# Patient Record
Sex: FEMALE | Race: WHITE | NOT HISPANIC OR LATINO | Employment: FULL TIME | RURAL
[De-identification: names, ages, dates, MRNs, and addresses within clinical notes are randomized per-mention and may not be internally consistent; named-entity substitution may affect disease eponyms.]

---

## 2020-11-17 ENCOUNTER — HISTORICAL (OUTPATIENT)
Dept: ADMINISTRATIVE | Facility: HOSPITAL | Age: 35
End: 2020-11-17

## 2020-11-18 LAB — SARS-COV+SARS-COV-2 AG RESP QL IA.RAPID: NEGATIVE

## 2022-01-11 ENCOUNTER — OFFICE VISIT (OUTPATIENT)
Dept: FAMILY MEDICINE | Facility: CLINIC | Age: 37
End: 2022-01-11
Payer: COMMERCIAL

## 2022-01-11 DIAGNOSIS — Z20.822 COUGH WITH EXPOSURE TO COVID-19 VIRUS: Primary | ICD-10-CM

## 2022-01-11 DIAGNOSIS — R05.8 COUGH WITH EXPOSURE TO COVID-19 VIRUS: Primary | ICD-10-CM

## 2022-01-11 LAB — SARS-COV-2 RNA RESP QL NAA+PROBE: POSITIVE

## 2022-01-11 PROCEDURE — 87635 SARS-COV-2 (COVID-19) QUALITATIVE PCR: ICD-10-PCS | Mod: ,,, | Performed by: CLINICAL MEDICAL LABORATORY

## 2022-01-11 PROCEDURE — 99213 PR OFFICE/OUTPT VISIT, EST, LEVL III, 20-29 MIN: ICD-10-PCS | Mod: GC,,, | Performed by: FAMILY MEDICINE

## 2022-01-11 PROCEDURE — 99213 OFFICE O/P EST LOW 20 MIN: CPT | Mod: GC,,, | Performed by: FAMILY MEDICINE

## 2022-01-11 PROCEDURE — 87635 SARS-COV-2 COVID-19 AMP PRB: CPT | Mod: ,,, | Performed by: CLINICAL MEDICAL LABORATORY

## 2022-01-12 VITALS
HEIGHT: 63 IN | WEIGHT: 150 LBS | HEART RATE: 73 BPM | BODY MASS INDEX: 26.58 KG/M2 | OXYGEN SATURATION: 99 % | TEMPERATURE: 97 F

## 2022-01-12 PROBLEM — R05.8 COUGH WITH EXPOSURE TO COVID-19 VIRUS: Status: ACTIVE | Noted: 2022-01-12

## 2022-01-12 PROBLEM — Z20.822 COUGH WITH EXPOSURE TO COVID-19 VIRUS: Status: ACTIVE | Noted: 2022-01-12

## 2022-01-12 NOTE — PROGRESS NOTES
Subjective:       Patient ID: Isabel Dixon is a 36 y.o. female.    Chief Complaint: No chief complaint on file.      HPI     36 y.o. patient who presents to [facility] for COVID-19 testing.     Symptoms: see ROS  Onset: 1/7  Known COVID-19 exposure: yes  Employment/School: teacher   Last shift: 1/6  Tobacco use: No  PMH: HTN  Vaccinated: yes    Review of Systems   Constitutional: Positive for fatigue. Negative for fever.   HENT: Positive for nasal congestion and sore throat.    Respiratory: Positive for cough. Negative for shortness of breath.    Cardiovascular: Negative for chest pain.   Gastrointestinal: Positive for abdominal pain and diarrhea. Negative for nausea and vomiting.   Genitourinary: Negative for dysuria.   Musculoskeletal: Positive for myalgias.   Integumentary:  Negative for rash.   Neurological: Positive for weakness and headaches. Negative for dizziness.           Objective:      Physical Exam  Constitutional:       General: She is not in acute distress.     Appearance: Normal appearance. She is not toxic-appearing.   HENT:      Head: Normocephalic and atraumatic.      Right Ear: External ear normal.      Left Ear: External ear normal.      Nose: Nose normal.   Eyes:      Extraocular Movements: Extraocular movements intact.   Cardiovascular:      Rate and Rhythm: Normal rate.      Pulses: Normal pulses.   Pulmonary:      Effort: Pulmonary effort is normal. No respiratory distress.   Musculoskeletal:      Cervical back: Normal range of motion.   Skin:     General: Skin is warm and dry.   Neurological:      Mental Status: She is alert and oriented to person, place, and time.           Assessment:       1. Cough with exposure to COVID-19 virus        Plan:       COVID-19 TESTING  -- patient with symptoms  -- patient with known exposure to COVID-19  -- influenza A/B: not done  -- COVID-19 PCR: positive  -- patient provided with educational material   -- patient given instructions for proper home  monitoring and isolation procedures  -- patient instructed to notify clinic of any changes, report to ED for evaluation of any new or worsening chest pain, shortness of breath, or other severe symptoms    Recommended quarantine/isolation: 10 days

## 2022-09-21 ENCOUNTER — OFFICE VISIT (OUTPATIENT)
Dept: OBSTETRICS AND GYNECOLOGY | Facility: CLINIC | Age: 37
End: 2022-09-21
Payer: COMMERCIAL

## 2022-09-21 VITALS
HEIGHT: 63 IN | HEART RATE: 69 BPM | BODY MASS INDEX: 27.07 KG/M2 | WEIGHT: 152.81 LBS | DIASTOLIC BLOOD PRESSURE: 81 MMHG | RESPIRATION RATE: 16 BRPM | SYSTOLIC BLOOD PRESSURE: 122 MMHG | TEMPERATURE: 98 F

## 2022-09-21 DIAGNOSIS — E55.9 VITAMIN D DEFICIENCY: ICD-10-CM

## 2022-09-21 DIAGNOSIS — I10 HYPERTENSION, UNSPECIFIED TYPE: ICD-10-CM

## 2022-09-21 DIAGNOSIS — G43.839 INTRACTABLE MENSTRUAL MIGRAINE WITHOUT STATUS MIGRAINOSUS: ICD-10-CM

## 2022-09-21 DIAGNOSIS — Z20.822 COUGH WITH EXPOSURE TO COVID-19 VIRUS: ICD-10-CM

## 2022-09-21 DIAGNOSIS — R05.8 COUGH WITH EXPOSURE TO COVID-19 VIRUS: ICD-10-CM

## 2022-09-21 DIAGNOSIS — N85.2 BULKY OR ENLARGED UTERUS: Primary | ICD-10-CM

## 2022-09-21 LAB
25(OH)D3 SERPL-MCNC: 19.7 NG/ML
ALBUMIN SERPL BCP-MCNC: 4.1 G/DL (ref 3.5–5)
ALBUMIN/GLOB SERPL: 1.3 {RATIO}
ALP SERPL-CCNC: 51 U/L (ref 37–98)
ALT SERPL W P-5'-P-CCNC: 23 U/L (ref 13–56)
ANION GAP SERPL CALCULATED.3IONS-SCNC: 12 MMOL/L (ref 7–16)
AST SERPL W P-5'-P-CCNC: 10 U/L (ref 15–37)
BASOPHILS # BLD AUTO: 0.05 K/UL (ref 0–0.2)
BASOPHILS NFR BLD AUTO: 0.6 % (ref 0–1)
BILIRUB SERPL-MCNC: 0.5 MG/DL (ref ?–1.2)
BILIRUB UR QL STRIP: NEGATIVE
BUN SERPL-MCNC: 12 MG/DL (ref 7–18)
BUN/CREAT SERPL: 17 (ref 6–20)
CALCIUM SERPL-MCNC: 8.6 MG/DL (ref 8.5–10.1)
CHLORIDE SERPL-SCNC: 109 MMOL/L (ref 98–107)
CLARITY UR: CLEAR
CO2 SERPL-SCNC: 23 MMOL/L (ref 21–32)
COLOR UR: COLORLESS
CREAT SERPL-MCNC: 0.69 MG/DL (ref 0.55–1.02)
DIFFERENTIAL METHOD BLD: ABNORMAL
EGFR (NO RACE VARIABLE) (RUSH/TITUS): 114 ML/MIN/1.73M²
EOSINOPHIL # BLD AUTO: 0.02 K/UL (ref 0–0.5)
EOSINOPHIL NFR BLD AUTO: 0.3 % (ref 1–4)
ERYTHROCYTE [DISTWIDTH] IN BLOOD BY AUTOMATED COUNT: 13.2 % (ref 11.5–14.5)
GLOBULIN SER-MCNC: 3.2 G/DL (ref 2–4)
GLUCOSE SERPL-MCNC: 91 MG/DL (ref 74–106)
GLUCOSE UR STRIP-MCNC: NORMAL MG/DL
HCT VFR BLD AUTO: 36.8 % (ref 38–47)
HGB BLD-MCNC: 12.2 G/DL (ref 12–16)
IMM GRANULOCYTES # BLD AUTO: 0.02 K/UL (ref 0–0.04)
IMM GRANULOCYTES NFR BLD: 0.3 % (ref 0–0.4)
KETONES UR STRIP-SCNC: NORMAL MG/DL
LEUKOCYTE ESTERASE UR QL STRIP: NEGATIVE
LYMPHOCYTES # BLD AUTO: 1.99 K/UL (ref 1–4.8)
LYMPHOCYTES NFR BLD AUTO: 24.9 % (ref 27–41)
MCH RBC QN AUTO: 31.1 PG (ref 27–31)
MCHC RBC AUTO-ENTMCNC: 33.2 G/DL (ref 32–36)
MCV RBC AUTO: 93.9 FL (ref 80–96)
MONOCYTES # BLD AUTO: 0.59 K/UL (ref 0–0.8)
MONOCYTES NFR BLD AUTO: 7.4 % (ref 2–6)
MPC BLD CALC-MCNC: 10.5 FL (ref 9.4–12.4)
NEUTROPHILS # BLD AUTO: 5.32 K/UL (ref 1.8–7.7)
NEUTROPHILS NFR BLD AUTO: 66.5 % (ref 53–65)
NITRITE UR QL STRIP: NEGATIVE
NRBC # BLD AUTO: 0 X10E3/UL
NRBC, AUTO (.00): 0 %
PH UR STRIP: 6.5 PH UNITS
PLATELET # BLD AUTO: 267 K/UL (ref 150–400)
POTASSIUM SERPL-SCNC: 4.4 MMOL/L (ref 3.5–5.1)
PROT SERPL-MCNC: 7.3 G/DL (ref 6.4–8.2)
PROT UR QL STRIP: NEGATIVE
RBC # BLD AUTO: 3.92 M/UL (ref 4.2–5.4)
RBC # UR STRIP: NEGATIVE /UL
SODIUM SERPL-SCNC: 140 MMOL/L (ref 136–145)
SP GR UR STRIP: 1.01
UROBILINOGEN UR STRIP-ACNC: NORMAL MG/DL
WBC # BLD AUTO: 7.99 K/UL (ref 4.5–11)

## 2022-09-21 PROCEDURE — 87591 N.GONORRHOEAE DNA AMP PROB: CPT | Mod: ,,, | Performed by: CLINICAL MEDICAL LABORATORY

## 2022-09-21 PROCEDURE — 88142 CYTOPATH C/V THIN LAYER: CPT | Mod: GCY | Performed by: OBSTETRICS & GYNECOLOGY

## 2022-09-21 PROCEDURE — 87591 CHLAMYDIA/GC, PCR (THINPREP): ICD-10-PCS | Mod: ,,, | Performed by: CLINICAL MEDICAL LABORATORY

## 2022-09-21 PROCEDURE — 82306 VITAMIN D: ICD-10-PCS | Mod: ,,, | Performed by: CLINICAL MEDICAL LABORATORY

## 2022-09-21 PROCEDURE — 87491 CHLMYD TRACH DNA AMP PROBE: CPT | Mod: ,,, | Performed by: CLINICAL MEDICAL LABORATORY

## 2022-09-21 PROCEDURE — 81003 URINALYSIS, REFLEX TO URINE CULTURE: ICD-10-PCS | Mod: QW,,, | Performed by: CLINICAL MEDICAL LABORATORY

## 2022-09-21 PROCEDURE — 36415 PR COLLECTION VENOUS BLOOD,VENIPUNCTURE: ICD-10-PCS | Mod: ,,, | Performed by: OBSTETRICS & GYNECOLOGY

## 2022-09-21 PROCEDURE — 87624 HUMAN PAPILLOMAVIRUS (HPV): ICD-10-PCS | Mod: ,,, | Performed by: CLINICAL MEDICAL LABORATORY

## 2022-09-21 PROCEDURE — 82306 VITAMIN D 25 HYDROXY: CPT | Mod: ,,, | Performed by: CLINICAL MEDICAL LABORATORY

## 2022-09-21 PROCEDURE — 81003 URINALYSIS AUTO W/O SCOPE: CPT | Mod: QW,,, | Performed by: CLINICAL MEDICAL LABORATORY

## 2022-09-21 PROCEDURE — 80053 COMPREHEN METABOLIC PANEL: CPT | Mod: ,,, | Performed by: CLINICAL MEDICAL LABORATORY

## 2022-09-21 PROCEDURE — 99205 PR OFFICE/OUTPT VISIT, NEW, LEVL V, 60-74 MIN: ICD-10-PCS | Mod: ,,, | Performed by: OBSTETRICS & GYNECOLOGY

## 2022-09-21 PROCEDURE — 99205 OFFICE O/P NEW HI 60 MIN: CPT | Mod: ,,, | Performed by: OBSTETRICS & GYNECOLOGY

## 2022-09-21 PROCEDURE — 85025 CBC WITH DIFFERENTIAL: ICD-10-PCS | Mod: ,,, | Performed by: CLINICAL MEDICAL LABORATORY

## 2022-09-21 PROCEDURE — 87491 CHLAMYDIA/GC, PCR (THINPREP): ICD-10-PCS | Mod: ,,, | Performed by: CLINICAL MEDICAL LABORATORY

## 2022-09-21 PROCEDURE — 36415 COLL VENOUS BLD VENIPUNCTURE: CPT | Mod: ,,, | Performed by: OBSTETRICS & GYNECOLOGY

## 2022-09-21 PROCEDURE — 80053 COMPREHENSIVE METABOLIC PANEL: ICD-10-PCS | Mod: ,,, | Performed by: CLINICAL MEDICAL LABORATORY

## 2022-09-21 PROCEDURE — 87624 HPV HI-RISK TYP POOLED RSLT: CPT | Mod: ,,, | Performed by: CLINICAL MEDICAL LABORATORY

## 2022-09-21 PROCEDURE — 85025 COMPLETE CBC W/AUTO DIFF WBC: CPT | Mod: ,,, | Performed by: CLINICAL MEDICAL LABORATORY

## 2022-09-21 RX ORDER — LISINOPRIL 5 MG/1
5 TABLET ORAL DAILY
COMMUNITY
Start: 2022-08-09

## 2022-09-21 RX ORDER — ERENUMAB-AOOE 70 MG/ML
70 INJECTION SUBCUTANEOUS
Qty: 1 ML | Refills: 11 | Status: SHIPPED | OUTPATIENT
Start: 2022-09-21 | End: 2023-08-23

## 2022-09-21 NOTE — PATIENT INSTRUCTIONS
Dr. David Iyer thanks you for this office visit at [facility].    Diagnosis for this visit:   Problem List Items Addressed This Visit          Pulmonary    RESOLVED: Cough with exposure to COVID-19 virus       Cardiac/Vascular    Hypertension (Chronic)     Other Visit Diagnoses       Intractable menstrual migraine without status migrainosus    -  Primary    Bulky or enlarged uterus                 The Plan:  Initiate Aimovig therapy for menstrual migraines; pelvic ultrasound; screening labs; thin prep Pap      Please practice best food and exercise habits for your age.    Dr. David Iyer recommends avoidance of smoking and illicit medications or habits.    Please call  or schedule for any change in your health.     Please keep the next scheduled appointment or call for any need to change the appointment.     Dr. David Iyer recommends yearly exams for good health maintenance.      Thank you    Dr. David Iyer  09/21/2022 5:17 PM

## 2022-09-21 NOTE — PROGRESS NOTES
Isabel Dixon female  for   Chief Complaint   Patient presents with    Annual Exam     Repeat new patient here for annual exam with pap. Patient states she is having bad headaches that last 3 days straight without relief.      OB History          4    Para   3    Term                AB   1    Living   3         SAB   1    IAB        Ectopic        Multiple        Live Births                      HPI:  37-year-old  4, para 3, AB1  female presents with a complaint of intractable presumed menstrual migraines.  Patient has been treated by various doctors with multiple medications without relief.  The primary problem appears to be triggering with menses the migraine headache    Last menstrual period was 2022.  Patient does give a history of a tubal sterilization.  That occurred with her last  section.  The patient has had 1 vaginal delivery and 2  sections with associated tubal sterilization at the last  section.    Patient denies dyspareunia.  Patient has no overactive bladder issues or urinary stress incontinence.  Patient does complain of voiding every 2 hours with no real nocturia and she does complain of some slight urinary stress incontinence.  The patient is not wear perineal pad.     Patient has use vitamin-D in the past currently is on no vitamin-D supplementation.    Past Medical History:   Diagnosis Date    Anemia 2009    Fibroid ?    Hypertension       Past Surgical History:   Procedure Laterality Date    ABDOMINAL SURGERY  ?    Removed fibroids and fixed fused uterus     SECTION   and     TUBAL LIGATION        Review of patient's allergies indicates:   Allergen Reactions    Iodine Rash    Shellfish containing products Anaphylaxis    Penicillins Hives        Review of Systems:Pertinent items are noted in HPI.     Physical exam:    /81 (BP Location: Left arm, Patient Position: Sitting)   Pulse 69   Temp 98.4 °F  "(36.9 °C)   Resp 16   Ht 5' 3" (1.6 m)   Wt 69.3 kg (152 lb 12.8 oz)   LMP 09/06/2022   BMI 27.07 kg/m²      General Appearance: healthy, alert, no distress, smiling, 27 BMI    HEENT: Normal exam    Lymphatic: no palpable lymphadenopathy, no hepatosplenomegaly    Chest:           Breasts:No dimpling, nipple retraction or discharge. No masses or nodes., Normal to inspection, and Normal breast tissue bilaterally           Lungs:clear to auscultation bilaterally, normal percussion bilaterally           Heart: regular rate and rhythm, S1, S2 normal, no murmur, click, rub or gallop and regular rate and rhythm    Abdomen: soft, non-tender, without masses or organomegaly, normal bowel sounds, without guarding, without rebound, and Pfannenstiel incision well-healed with no ventral hernia; no abdominal bruit and no ascites    Pelvic:                    Vulva: normal appearing vulva with no masses, tenderness or lesions                    Cervix: normal appearing cervix without discharge or lesions, multiparous os                    Uterus anteverted, uterus is slightly enlarged                     Annexa(e): normal adnexa in size, nontender and no masses                   Rectal: normal rectal, no masses, guaiac negative stool obtained.     Extremity: normal, extremities warm, no clubbing, no cyanosis, no edema, non-tender    Skin: normal exam        Assessment:   Problem List Items Addressed This Visit          Pulmonary    RESOLVED: Cough with exposure to COVID-19 virus       Cardiac/Vascular    Hypertension (Chronic)    Relevant Orders    CBC Auto Differential (Completed)    Comprehensive Metabolic Panel (Completed)    Vitamin D (Completed)    Urinalysis, Reflex to Urine Culture (Completed)    ThinPrep Pap Test (Completed)    HPV DNA probe, amplified (Completed)    CT/GC, PCR (THINPREP) (Completed)     Other Visit Diagnoses       Bulky or enlarged uterus    -  Primary    Relevant Orders    CBC Auto Differential " (Completed)    Comprehensive Metabolic Panel (Completed)    Urinalysis, Reflex to Urine Culture (Completed)    ThinPrep Pap Test (Completed)    US Pelvis Complete Non OB    HPV DNA probe, amplified (Completed)    CT/GC, PCR (THINPREP) (Completed)    Intractable menstrual migraine without status migrainosus        Relevant Orders    CBC Auto Differential (Completed)    Comprehensive Metabolic Panel (Completed)    Urinalysis, Reflex to Urine Culture (Completed)    ThinPrep Pap Test (Completed)    HPV DNA probe, amplified (Completed)    CT/GC, PCR (THINPREP) (Completed)    Vitamin D deficiency        Vitamin D 25-Hydroxy, Blood ng/mL 19.7 -low      Relevant Orders    Vitamin D (Completed)             Plan:  Screening labs; thin prep; vitamin-D check             Recommend pelvic ultrasound for size and shape the uterus             Recommend Aimovig for migraine headaches             Recommend vitamin D3 over-the-counter 4000 units daily.                           Addendum on 09/26/2022:  Patient does need extra vitamin-D.  She will be placed on vitamin D2 58865 units twice a week for indefinite therapy and recheck her total vitamin-D in 3 months.

## 2022-09-23 LAB
CHLAMYDIA BY PCR: NEGATIVE
GH SERPL-MCNC: NORMAL NG/ML
HPV 16: NEGATIVE
HPV 18: NEGATIVE
HPV OTHER: NEGATIVE
INSULIN SERPL-ACNC: NORMAL U[IU]/ML
LAB AP CLINICAL INFORMATION: NORMAL
LAB AP GYN INTERPRETATION: NEGATIVE
LAB AP PAP DISCLAIMER COMMENTS: NORMAL
N. GONORRHOEAE (GC) BY PCR: NEGATIVE
RENIN PLAS-CCNC: NORMAL NG/ML/H

## 2022-09-26 ENCOUNTER — TELEPHONE (OUTPATIENT)
Dept: OBSTETRICS AND GYNECOLOGY | Facility: CLINIC | Age: 37
End: 2022-09-26
Payer: COMMERCIAL

## 2022-09-26 RX ORDER — ERGOCALCIFEROL 1.25 MG/1
50000 CAPSULE ORAL
Qty: 24 CAPSULE | Refills: 3 | Status: SHIPPED | OUTPATIENT
Start: 2022-09-29 | End: 2023-08-23

## 2022-09-26 NOTE — TELEPHONE ENCOUNTER
Informed pt that [facility] denied her Aimovig because our office doesn't have any clinical notes on other medications that she has tried. Advised her to have the provider who was treating her for her migraines to send in clinical notes to [facility] and that they will pay for medication.    Voiced agreement and understanding

## 2022-09-27 NOTE — TELEPHONE ENCOUNTER
----- Message from David Iyer MD sent at 9/26/2022  1:44 PM CDT -----  Call patient    Advised her that Dr. David Iyer has sent to her pharmacy actual vitamin-D 2 to take twice weekly in addition to her vitamin-D 3 daily.  Her vitamin-D was very low at 19 ng/mL.  She should have her vitamin-D rechecked in 3-4 months.               Addendum on 09/26/2022:  Patient does need extra vitamin-D.  She will be placed on vitamin D2 79971 units twice a week for indefinite therapy and recheck her total vitamin-D in 3 months.      9/27/2022  Per Dr. Iyer, notified patient via phone re:  Vitamin D level results have returned very low / deficient (result:  19);  Needs treatment with over the counter Vitamin D3 supplement 4,000 units daily, as well as, prescription Vitamin D2 supplement twice a week (every  3 - 4 days).  Keep next scheduled Follow Up appointment for Pelvic Ultrasound.  Return to clinic in 3 - 4 months to recheck Vitamin D level.  Patient voiced agreement and understanding.

## 2022-09-30 ENCOUNTER — HOSPITAL ENCOUNTER (OUTPATIENT)
Dept: RADIOLOGY | Facility: HOSPITAL | Age: 37
Discharge: HOME | End: 2022-09-30
Attending: OBSTETRICS & GYNECOLOGY
Payer: COMMERCIAL

## 2022-09-30 DIAGNOSIS — N85.2 BULKY OR ENLARGED UTERUS: ICD-10-CM

## 2022-09-30 PROCEDURE — 76856 US EXAM PELVIC COMPLETE: CPT | Mod: TC

## 2022-09-30 PROCEDURE — 76856 US PELVIS COMPLETE NON OB: ICD-10-PCS | Mod: 26,,, | Performed by: RADIOLOGY

## 2022-09-30 PROCEDURE — 76856 US EXAM PELVIC COMPLETE: CPT | Mod: 26,,, | Performed by: RADIOLOGY

## 2023-08-23 ENCOUNTER — OFFICE VISIT (OUTPATIENT)
Dept: FAMILY MEDICINE | Facility: CLINIC | Age: 38
End: 2023-08-23
Payer: COMMERCIAL

## 2023-08-23 VITALS
SYSTOLIC BLOOD PRESSURE: 122 MMHG | OXYGEN SATURATION: 98 % | WEIGHT: 153 LBS | BODY MASS INDEX: 27.11 KG/M2 | RESPIRATION RATE: 18 BRPM | DIASTOLIC BLOOD PRESSURE: 74 MMHG | HEART RATE: 94 BPM | HEIGHT: 63 IN | TEMPERATURE: 98 F

## 2023-08-23 DIAGNOSIS — Z13.220 ENCOUNTER FOR SCREENING FOR LIPOID DISORDERS: ICD-10-CM

## 2023-08-23 DIAGNOSIS — I10 ESSENTIAL HYPERTENSION, BENIGN: Primary | ICD-10-CM

## 2023-08-23 DIAGNOSIS — G43.829 MENSTRUAL MIGRAINE WITHOUT STATUS MIGRAINOSUS, NOT INTRACTABLE: ICD-10-CM

## 2023-08-23 DIAGNOSIS — Z11.59 ENCOUNTER FOR HEPATITIS C SCREENING TEST FOR LOW RISK PATIENT: ICD-10-CM

## 2023-08-23 DIAGNOSIS — E55.9 VITAMIN D DEFICIENCY: ICD-10-CM

## 2023-08-23 DIAGNOSIS — Z13.1 SCREENING FOR DIABETES MELLITUS: ICD-10-CM

## 2023-08-23 DIAGNOSIS — Z11.4 ENCOUNTER FOR SCREENING FOR HIV: ICD-10-CM

## 2023-08-23 LAB
25(OH)D3 SERPL-MCNC: 43 NG/ML
ALBUMIN SERPL BCP-MCNC: 3.6 G/DL (ref 3.5–5)
ALBUMIN/GLOB SERPL: 1.1 {RATIO}
ALP SERPL-CCNC: 50 U/L (ref 37–98)
ALT SERPL W P-5'-P-CCNC: 21 U/L (ref 13–56)
ANION GAP SERPL CALCULATED.3IONS-SCNC: 11 MMOL/L (ref 7–16)
AST SERPL W P-5'-P-CCNC: 7 U/L (ref 15–37)
BASOPHILS # BLD AUTO: 0.04 K/UL (ref 0–0.2)
BASOPHILS NFR BLD AUTO: 0.6 % (ref 0–1)
BILIRUB SERPL-MCNC: 0.3 MG/DL (ref ?–1.2)
BUN SERPL-MCNC: 15 MG/DL (ref 7–18)
BUN/CREAT SERPL: 19 (ref 6–20)
CALCIUM SERPL-MCNC: 8.3 MG/DL (ref 8.5–10.1)
CHLORIDE SERPL-SCNC: 107 MMOL/L (ref 98–107)
CHOLEST SERPL-MCNC: 184 MG/DL (ref 0–200)
CHOLEST/HDLC SERPL: 3.6 {RATIO}
CO2 SERPL-SCNC: 26 MMOL/L (ref 21–32)
CREAT SERPL-MCNC: 0.8 MG/DL (ref 0.55–1.02)
DIFFERENTIAL METHOD BLD: ABNORMAL
EGFR (NO RACE VARIABLE) (RUSH/TITUS): 96 ML/MIN/1.73M2
EOSINOPHIL # BLD AUTO: 0.03 K/UL (ref 0–0.5)
EOSINOPHIL NFR BLD AUTO: 0.4 % (ref 1–4)
ERYTHROCYTE [DISTWIDTH] IN BLOOD BY AUTOMATED COUNT: 13.4 % (ref 11.5–14.5)
EST. AVERAGE GLUCOSE BLD GHB EST-MCNC: 77 MG/DL
GLOBULIN SER-MCNC: 3.3 G/DL (ref 2–4)
GLUCOSE SERPL-MCNC: 99 MG/DL (ref 74–106)
HBA1C MFR BLD HPLC: 4.9 % (ref 4.5–6.6)
HCT VFR BLD AUTO: 34.5 % (ref 38–47)
HCV AB SER QL: NORMAL
HDLC SERPL-MCNC: 51 MG/DL (ref 40–60)
HGB BLD-MCNC: 11.6 G/DL (ref 12–16)
HIV 1+O+2 AB SERPL QL: NORMAL
IMM GRANULOCYTES # BLD AUTO: 0.02 K/UL (ref 0–0.04)
IMM GRANULOCYTES NFR BLD: 0.3 % (ref 0–0.4)
LDLC SERPL CALC-MCNC: 117 MG/DL
LDLC/HDLC SERPL: 2.3 {RATIO}
LYMPHOCYTES # BLD AUTO: 1.6 K/UL (ref 1–4.8)
LYMPHOCYTES NFR BLD AUTO: 23.8 % (ref 27–41)
MCH RBC QN AUTO: 30.9 PG (ref 27–31)
MCHC RBC AUTO-ENTMCNC: 33.6 G/DL (ref 32–36)
MCV RBC AUTO: 92 FL (ref 80–96)
MONOCYTES # BLD AUTO: 0.46 K/UL (ref 0–0.8)
MONOCYTES NFR BLD AUTO: 6.9 % (ref 2–6)
MPC BLD CALC-MCNC: 10.1 FL (ref 9.4–12.4)
NEUTROPHILS # BLD AUTO: 4.56 K/UL (ref 1.8–7.7)
NEUTROPHILS NFR BLD AUTO: 68 % (ref 53–65)
NONHDLC SERPL-MCNC: 133 MG/DL
NRBC # BLD AUTO: 0 X10E3/UL
NRBC, AUTO (.00): 0 %
PLATELET # BLD AUTO: 236 K/UL (ref 150–400)
POTASSIUM SERPL-SCNC: 4 MMOL/L (ref 3.5–5.1)
PROT SERPL-MCNC: 6.9 G/DL (ref 6.4–8.2)
RBC # BLD AUTO: 3.75 M/UL (ref 4.2–5.4)
SODIUM SERPL-SCNC: 140 MMOL/L (ref 136–145)
TRIGL SERPL-MCNC: 79 MG/DL (ref 35–150)
TSH SERPL DL<=0.005 MIU/L-ACNC: 1.81 UIU/ML (ref 0.36–3.74)
VLDLC SERPL-MCNC: 16 MG/DL
WBC # BLD AUTO: 6.71 K/UL (ref 4.5–11)

## 2023-08-23 PROCEDURE — 3008F BODY MASS INDEX DOCD: CPT | Mod: ,,,

## 2023-08-23 PROCEDURE — 87389 HIV 1 / 2 ANTIBODY: ICD-10-PCS | Mod: ,,, | Performed by: CLINICAL MEDICAL LABORATORY

## 2023-08-23 PROCEDURE — 99203 PR OFFICE/OUTPT VISIT, NEW, LEVL III, 30-44 MIN: ICD-10-PCS | Mod: ,,,

## 2023-08-23 PROCEDURE — 3044F HG A1C LEVEL LT 7.0%: CPT | Mod: ,,,

## 2023-08-23 PROCEDURE — 83036 HEMOGLOBIN GLYCOSYLATED A1C: CPT | Mod: ,,, | Performed by: CLINICAL MEDICAL LABORATORY

## 2023-08-23 PROCEDURE — 1160F RVW MEDS BY RX/DR IN RCRD: CPT | Mod: ,,,

## 2023-08-23 PROCEDURE — 80061 LIPID PANEL: CPT | Mod: ,,, | Performed by: CLINICAL MEDICAL LABORATORY

## 2023-08-23 PROCEDURE — 82306 VITAMIN D 25 HYDROXY: CPT | Mod: ,,, | Performed by: CLINICAL MEDICAL LABORATORY

## 2023-08-23 PROCEDURE — 99203 OFFICE O/P NEW LOW 30 MIN: CPT | Mod: ,,,

## 2023-08-23 PROCEDURE — 86803 HEPATITIS C AB TEST: CPT | Mod: ,,, | Performed by: CLINICAL MEDICAL LABORATORY

## 2023-08-23 PROCEDURE — 87389 HIV-1 AG W/HIV-1&-2 AB AG IA: CPT | Mod: ,,, | Performed by: CLINICAL MEDICAL LABORATORY

## 2023-08-23 PROCEDURE — 3074F PR MOST RECENT SYSTOLIC BLOOD PRESSURE < 130 MM HG: ICD-10-PCS | Mod: ,,,

## 2023-08-23 PROCEDURE — 86803 HEPATITIS C ANTIBODY: ICD-10-PCS | Mod: ,,, | Performed by: CLINICAL MEDICAL LABORATORY

## 2023-08-23 PROCEDURE — 3074F SYST BP LT 130 MM HG: CPT | Mod: ,,,

## 2023-08-23 PROCEDURE — 80061 LIPID PANEL: ICD-10-PCS | Mod: ,,, | Performed by: CLINICAL MEDICAL LABORATORY

## 2023-08-23 PROCEDURE — 3008F PR BODY MASS INDEX (BMI) DOCUMENTED: ICD-10-PCS | Mod: ,,,

## 2023-08-23 PROCEDURE — 1159F PR MEDICATION LIST DOCUMENTED IN MEDICAL RECORD: ICD-10-PCS | Mod: ,,,

## 2023-08-23 PROCEDURE — 4010F ACE/ARB THERAPY RXD/TAKEN: CPT | Mod: ,,,

## 2023-08-23 PROCEDURE — 82306 VITAMIN D: ICD-10-PCS | Mod: ,,, | Performed by: CLINICAL MEDICAL LABORATORY

## 2023-08-23 PROCEDURE — 80050 GENERAL HEALTH PANEL: CPT | Mod: ,,, | Performed by: CLINICAL MEDICAL LABORATORY

## 2023-08-23 PROCEDURE — 80050 PR  GENERAL HEALTH PANEL: ICD-10-PCS | Mod: ,,, | Performed by: CLINICAL MEDICAL LABORATORY

## 2023-08-23 PROCEDURE — 3078F DIAST BP <80 MM HG: CPT | Mod: ,,,

## 2023-08-23 PROCEDURE — 36415 COLL VENOUS BLD VENIPUNCTURE: CPT | Mod: ,,,

## 2023-08-23 PROCEDURE — 4010F PR ACE/ARB THEARPY RXD/TAKEN: ICD-10-PCS | Mod: ,,,

## 2023-08-23 PROCEDURE — 1160F PR REVIEW ALL MEDS BY PRESCRIBER/CLIN PHARMACIST DOCUMENTED: ICD-10-PCS | Mod: ,,,

## 2023-08-23 PROCEDURE — 3044F PR MOST RECENT HEMOGLOBIN A1C LEVEL <7.0%: ICD-10-PCS | Mod: ,,,

## 2023-08-23 PROCEDURE — 3078F PR MOST RECENT DIASTOLIC BLOOD PRESSURE < 80 MM HG: ICD-10-PCS | Mod: ,,,

## 2023-08-23 PROCEDURE — 36415 PR COLLECTION VENOUS BLOOD,VENIPUNCTURE: ICD-10-PCS | Mod: ,,,

## 2023-08-23 PROCEDURE — 83036 HEMOGLOBIN A1C: ICD-10-PCS | Mod: ,,, | Performed by: CLINICAL MEDICAL LABORATORY

## 2023-08-23 PROCEDURE — 1159F MED LIST DOCD IN RCRD: CPT | Mod: ,,,

## 2023-08-24 NOTE — PROGRESS NOTES
Subjective     Patient ID: Isabel Dixon is a 38 y.o. female.    Chief Complaint: Follow-up (Went and seen Dr. Iyer and diagnosed her with Acute Menstrual Migraines)    Patient presents today for complaints of worsening migraine headaches. She has been diagnosed with menstrual migraines in the past. She has tried and failed topamax, beta blocker, triptans, and was denies amovig by her insurance. She has a history of a tubal ligation.    Follow-up  Associated symptoms include headaches. Pertinent negatives include no abdominal pain, change in bowel habit, chest pain, chills, coughing, fever, joint swelling, myalgias, numbness or weakness.     Review of Systems   Constitutional:  Negative for activity change, appetite change, chills and fever.   HENT:  Negative for ear pain, hearing loss, trouble swallowing and voice change.    Eyes:  Negative for visual disturbance.   Respiratory:  Negative for apnea, cough, chest tightness and shortness of breath.    Cardiovascular:  Negative for chest pain, palpitations and leg swelling.   Gastrointestinal:  Negative for abdominal pain, blood in stool, change in bowel habit, reflux and change in bowel habit.   Endocrine: Negative for cold intolerance and heat intolerance.   Genitourinary:  Negative for bladder incontinence, difficulty urinating, flank pain, menstrual irregularity and menstrual problem.   Musculoskeletal:  Negative for back pain, gait problem, joint swelling and myalgias.   Integumentary:  Negative for color change and pallor.   Neurological:  Positive for headaches. Negative for dizziness, weakness and numbness.   Psychiatric/Behavioral:  Negative for sleep disturbance. The patient is not nervous/anxious.           Objective     Physical Exam  Vitals and nursing note reviewed.   Constitutional:       Appearance: Normal appearance.   HENT:      Head: Normocephalic.      Nose: Nose normal.      Mouth/Throat:      Mouth: Mucous membranes are moist.   Eyes:       Extraocular Movements: Extraocular movements intact.      Conjunctiva/sclera: Conjunctivae normal.      Pupils: Pupils are equal, round, and reactive to light.   Cardiovascular:      Rate and Rhythm: Normal rate and regular rhythm.      Pulses: Normal pulses.      Heart sounds: Normal heart sounds.   Pulmonary:      Effort: Pulmonary effort is normal.      Breath sounds: Normal breath sounds.   Abdominal:      General: Abdomen is flat. Bowel sounds are normal.      Palpations: Abdomen is soft.   Musculoskeletal:         General: Normal range of motion.      Cervical back: Normal range of motion and neck supple.   Skin:     General: Skin is warm and dry.      Capillary Refill: Capillary refill takes less than 2 seconds.   Neurological:      General: No focal deficit present.      Mental Status: She is alert and oriented to person, place, and time.   Psychiatric:         Behavior: Behavior normal.         Thought Content: Thought content normal.          Assessment and Plan     1. Essential hypertension, benign  -     TSH  -     Comprehensive Metabolic Panel  -     CBC Auto Differential    2. Menstrual migraine without status migrainosus, not intractable    3. Vitamin D deficiency  -     Vitamin D    4. Encounter for screening for lipoid disorders  -     Lipid Panel    5. Screening for diabetes mellitus  -     Hemoglobin A1C    6. Encounter for screening for HIV  -     HIV 1/2 Ag/Ab (4th Gen)    7. Encounter for hepatitis C screening test for low risk patient  -     Hepatitis C antibody        Sample of Nurtec given to patient. Will send in prescription based off of patient's response to the medication. Will notify patient of lab results.          Follow up in about 3 months (around 11/23/2023).

## 2023-08-24 NOTE — PROGRESS NOTES
Patient presents today for complaints of worsening migraine headaches. She has been diagnosed with menstrual migraines in the past. She has tried and failed topamax, beta blocker, triptans, and was denies amovig by her insurance. She has a history of a tubal ligation.

## 2023-09-18 ENCOUNTER — TELEPHONE (OUTPATIENT)
Dept: FAMILY MEDICINE | Facility: CLINIC | Age: 38
End: 2023-09-18
Payer: COMMERCIAL

## 2023-09-18 DIAGNOSIS — G43.829 MENSTRUAL MIGRAINE WITHOUT STATUS MIGRAINOSUS, NOT INTRACTABLE: Primary | ICD-10-CM

## 2023-09-18 RX ORDER — UBROGEPANT 100 MG/1
100 TABLET ORAL
Qty: 16 TABLET | Refills: 11 | Status: SHIPPED | OUTPATIENT
Start: 2023-09-18

## 2023-09-18 NOTE — TELEPHONE ENCOUNTER
----- Message from Kaylen Martin sent at 9/18/2023  3:45 PM CDT -----  Wants ubrelvy sent to [facility]. Both ubrelvy and nurtec worked but ubrelvy worked better so she wants to try to get it first. If that doesn't work then she will try to get nurtec.   [phone]     Negative

## 2024-04-15 DIAGNOSIS — I10 HYPERTENSION, UNSPECIFIED TYPE: Primary | Chronic | ICD-10-CM

## 2024-04-15 RX ORDER — LISINOPRIL 5 MG/1
5 TABLET ORAL DAILY
Qty: 30 TABLET | Refills: 0 | Status: SHIPPED | OUTPATIENT
Start: 2024-04-15 | End: 2024-05-14 | Stop reason: SDUPTHER

## 2024-05-14 ENCOUNTER — OFFICE VISIT (OUTPATIENT)
Dept: FAMILY MEDICINE | Facility: CLINIC | Age: 39
End: 2024-05-14
Payer: COMMERCIAL

## 2024-05-14 VITALS
HEART RATE: 83 BPM | HEIGHT: 63 IN | OXYGEN SATURATION: 100 % | TEMPERATURE: 98 F | WEIGHT: 156 LBS | RESPIRATION RATE: 16 BRPM | SYSTOLIC BLOOD PRESSURE: 138 MMHG | DIASTOLIC BLOOD PRESSURE: 82 MMHG | BODY MASS INDEX: 27.64 KG/M2

## 2024-05-14 DIAGNOSIS — I10 HYPERTENSION, UNSPECIFIED TYPE: Primary | Chronic | ICD-10-CM

## 2024-05-14 DIAGNOSIS — G43.829 MENSTRUAL MIGRAINE WITHOUT STATUS MIGRAINOSUS, NOT INTRACTABLE: ICD-10-CM

## 2024-05-14 PROCEDURE — 1160F RVW MEDS BY RX/DR IN RCRD: CPT | Mod: ,,,

## 2024-05-14 PROCEDURE — 99213 OFFICE O/P EST LOW 20 MIN: CPT | Mod: ,,,

## 2024-05-14 PROCEDURE — 3075F SYST BP GE 130 - 139MM HG: CPT | Mod: ,,,

## 2024-05-14 PROCEDURE — 3079F DIAST BP 80-89 MM HG: CPT | Mod: ,,,

## 2024-05-14 PROCEDURE — 4010F ACE/ARB THERAPY RXD/TAKEN: CPT | Mod: ,,,

## 2024-05-14 PROCEDURE — 1159F MED LIST DOCD IN RCRD: CPT | Mod: ,,,

## 2024-05-14 PROCEDURE — 3008F BODY MASS INDEX DOCD: CPT | Mod: ,,,

## 2024-05-14 RX ORDER — LISINOPRIL 5 MG/1
5 TABLET ORAL DAILY
Qty: 90 TABLET | Refills: 3 | Status: SHIPPED | OUTPATIENT
Start: 2024-05-14 | End: 2025-05-09

## 2024-05-15 NOTE — PROGRESS NOTES
Subjective     Patient ID: Isabel Dixon is a 38 y.o. female.    Chief Complaint: Medication Refill (Pt request refills on bp med, pt states ubrelvy is not covered on insurance. States she has taken nurtec qulipta worked well )    LR is a 38 year old female that presents today for medication refills. She has a history of HTN and migraines. She reports 2 migraines a month that both last 3 days. She has a migraine after he menstrual cycle and mid-cycle. In the past, she has taken topamax, beta-blockers, triptans, and amovig without relief of migraines. She was started on ubrelvy, which patient states was very effective for her migraines. She received a letter from her insurance company 2 months ago that denied approval for the medication.     Hypertension  This is a chronic problem. The current episode started more than 1 year ago. The problem has been gradually improving since onset. The problem is controlled. Associated symptoms include headaches. Pertinent negatives include no anxiety, blurred vision, chest pain, malaise/fatigue, neck pain, orthopnea, palpitations, peripheral edema, PND, shortness of breath or sweats. There are no associated agents to hypertension. There are no known risk factors for coronary artery disease. Past treatments include ACE inhibitors. The current treatment provides significant improvement. There are no compliance problems.      Review of Systems   Constitutional:  Negative for activity change, appetite change, chills, fever and malaise/fatigue.   HENT:  Negative for ear pain, hearing loss, trouble swallowing and voice change.    Eyes:  Negative for blurred vision and visual disturbance.   Respiratory:  Negative for apnea, cough, chest tightness and shortness of breath.    Cardiovascular:  Negative for chest pain, palpitations, orthopnea, leg swelling and PND.   Gastrointestinal:  Negative for abdominal pain, blood in stool, change in bowel habit and reflux.   Genitourinary:  Negative for  bladder incontinence, difficulty urinating and flank pain.   Musculoskeletal:  Negative for back pain, gait problem, joint swelling, myalgias and neck pain.   Integumentary:  Negative for color change and pallor.   Neurological:  Positive for headaches. Negative for dizziness, weakness and numbness.   Psychiatric/Behavioral:  Negative for sleep disturbance. The patient is not nervous/anxious.           Objective     Physical Exam  Vitals and nursing note reviewed.   Constitutional:       Appearance: Normal appearance. She is normal weight.   HENT:      Head: Normocephalic.      Nose: Nose normal.      Mouth/Throat:      Mouth: Mucous membranes are moist.   Eyes:      Extraocular Movements: Extraocular movements intact.      Conjunctiva/sclera: Conjunctivae normal.      Pupils: Pupils are equal, round, and reactive to light.   Cardiovascular:      Rate and Rhythm: Normal rate and regular rhythm.      Pulses: Normal pulses.      Heart sounds: Normal heart sounds.   Pulmonary:      Effort: Pulmonary effort is normal.      Breath sounds: Normal breath sounds.   Abdominal:      General: Abdomen is flat. Bowel sounds are normal.      Palpations: Abdomen is soft.   Musculoskeletal:         General: Normal range of motion.      Cervical back: Normal range of motion and neck supple.   Skin:     General: Skin is warm and dry.      Capillary Refill: Capillary refill takes less than 2 seconds.   Neurological:      General: No focal deficit present.      Mental Status: She is alert and oriented to person, place, and time.   Psychiatric:         Behavior: Behavior normal.         Thought Content: Thought content normal.          Assessment and Plan     1. Hypertension, unspecified type  -     lisinopriL (PRINIVIL,ZESTRIL) 5 MG tablet; Take 1 tablet (5 mg total) by mouth once daily.  Dispense: 90 tablet; Refill: 3    2. Menstrual migraine without status migrainosus, not intractable        Take medications as prescribed  Patient  was able to  prescription of ubrelvy at her pharmacy today. Patient to call if she is unable to obtain medication in future. Will consider nurte at that time.          Follow up in about 6 months (around 11/14/2024).

## 2024-06-10 ENCOUNTER — TELEPHONE (OUTPATIENT)
Dept: OBSTETRICS AND GYNECOLOGY | Facility: CLINIC | Age: 39
End: 2024-06-10
Payer: COMMERCIAL

## 2024-06-10 NOTE — TELEPHONE ENCOUNTER
----- Message from Merlene Costa CMA sent at 6/7/2024 10:44 AM CDT -----    ----- Message -----  From: Herminia Bravo  Sent: 6/7/2024  10:39 AM CDT  To: #    Pt needs nurse to call at [phone]. Did not say what its concerning other than its important.She said for any nurse to call her please.    Patient stated she was seen at the ER and told she may be perimenopausal, Informed patient Dr. Iyer will not be in this entire week she agreed to a next available. 07/01/2024

## 2024-07-01 ENCOUNTER — OFFICE VISIT (OUTPATIENT)
Dept: OBSTETRICS AND GYNECOLOGY | Facility: CLINIC | Age: 39
End: 2024-07-01
Payer: COMMERCIAL

## 2024-07-01 VITALS
TEMPERATURE: 99 F | BODY MASS INDEX: 28.95 KG/M2 | DIASTOLIC BLOOD PRESSURE: 72 MMHG | OXYGEN SATURATION: 99 % | WEIGHT: 163.38 LBS | RESPIRATION RATE: 18 BRPM | HEIGHT: 63 IN | SYSTOLIC BLOOD PRESSURE: 123 MMHG | HEART RATE: 88 BPM

## 2024-07-01 DIAGNOSIS — E55.9 VITAMIN D DEFICIENCY: ICD-10-CM

## 2024-07-01 DIAGNOSIS — D21.9 FIBROID: ICD-10-CM

## 2024-07-01 DIAGNOSIS — N93.8 DUB (DYSFUNCTIONAL UTERINE BLEEDING): Primary | ICD-10-CM

## 2024-07-01 DIAGNOSIS — N95.1 PERIMENOPAUSE: ICD-10-CM

## 2024-07-01 DIAGNOSIS — I10 HYPERTENSION, UNSPECIFIED TYPE: Chronic | ICD-10-CM

## 2024-07-01 DIAGNOSIS — R23.2 HOT FLASH DUE TO MEDICATION: ICD-10-CM

## 2024-07-01 DIAGNOSIS — T50.905A HOT FLASH DUE TO MEDICATION: ICD-10-CM

## 2024-07-01 PROCEDURE — 4010F ACE/ARB THERAPY RXD/TAKEN: CPT | Mod: ,,, | Performed by: OBSTETRICS & GYNECOLOGY

## 2024-07-01 PROCEDURE — 99459 PELVIC EXAMINATION: CPT | Mod: PBBFAC | Performed by: OBSTETRICS & GYNECOLOGY

## 2024-07-01 PROCEDURE — 3074F SYST BP LT 130 MM HG: CPT | Mod: ,,, | Performed by: OBSTETRICS & GYNECOLOGY

## 2024-07-01 PROCEDURE — 99214 OFFICE O/P EST MOD 30 MIN: CPT | Mod: S$PBB,,, | Performed by: OBSTETRICS & GYNECOLOGY

## 2024-07-01 PROCEDURE — 99214 OFFICE O/P EST MOD 30 MIN: CPT | Mod: PBBFAC | Performed by: OBSTETRICS & GYNECOLOGY

## 2024-07-01 PROCEDURE — 99459 PELVIC EXAMINATION: CPT | Mod: S$PBB,,, | Performed by: OBSTETRICS & GYNECOLOGY

## 2024-07-01 PROCEDURE — 3008F BODY MASS INDEX DOCD: CPT | Mod: ,,, | Performed by: OBSTETRICS & GYNECOLOGY

## 2024-07-01 PROCEDURE — 1159F MED LIST DOCD IN RCRD: CPT | Mod: ,,, | Performed by: OBSTETRICS & GYNECOLOGY

## 2024-07-01 PROCEDURE — 3078F DIAST BP <80 MM HG: CPT | Mod: ,,, | Performed by: OBSTETRICS & GYNECOLOGY

## 2024-07-01 PROCEDURE — 1160F RVW MEDS BY RX/DR IN RCRD: CPT | Mod: ,,, | Performed by: OBSTETRICS & GYNECOLOGY

## 2024-07-01 PROCEDURE — 88142 CYTOPATH C/V THIN LAYER: CPT | Mod: TC,GCY | Performed by: OBSTETRICS & GYNECOLOGY

## 2024-07-01 PROCEDURE — 99999 PR PBB SHADOW E&M-EST. PATIENT-LVL IV: CPT | Mod: PBBFAC,,, | Performed by: OBSTETRICS & GYNECOLOGY

## 2024-07-01 NOTE — PROGRESS NOTES
Isabel Dixon female  for   Chief Complaint   Patient presents with    Menopause     Patient is here due to having issues with her hormones and Menopause, she stated having 3 menstrual cycles in one month. She also complains of hot flashes mostly at night.       OB History          4    Para   3    Term                AB   1    Living   3         SAB   1    IAB        Ectopic        Multiple        Live Births                      HPI:  Patient presents for evaluation of abnormal history of recent bleeding and some perimenopausal type hot flashes-mostly nocturnal.  Patient's last normal menstrual cycle was 2024 for several days.  The patient has failed for the since April that she has been having some perimenopausal hot flashes primarily at night.  There may be days she has no daytime hot flashes.           Patient denies vaginal dryness.  Patient was sexually active without dyspareunia.             Most recent episode of bleeding was the 2024 for proximally 5+ days and then she had another abnormal cycle on 06/20 3/2024.  Patient denies dysmenorrhea.           Patient has a known history of leiomyoma.  Last pelvic ultrasound was 2022.    She gives a history that she has a aunt with a hypercoagulability and has had subsequent multiple venous clots.  She has had no history of any hypercoagulability problems or DVTs etc..    Past Medical History:   Diagnosis Date    Anemia 2009    Fibroid ?    Hypertension       Past Surgical History:   Procedure Laterality Date    ABDOMINAL SURGERY  ?    Removed fibroids and fixed fused uterus     SECTION   and     TUBAL LIGATION        Review of patient's allergies indicates:   Allergen Reactions    Iodine Rash    Shellfish containing products Anaphylaxis    Penicillins Hives        Review of Systems:Pertinent items are noted in HPI.     Physical exam:This gyn related exam was chaperoned by a female medical  "assistant.      /72 (BP Location: Right arm, Patient Position: Sitting)   Pulse 88   Temp 98.8 °F (37.1 °C)   Resp 18   Ht 5' 3" (1.6 m)   Wt 74.1 kg (163 lb 6.4 oz)   LMP 06/23/2024   SpO2 99%   BMI 28.95 kg/m²      General Appearance: healthy, alert, no distress, smiling    HEENT: Normal exam    Lymphatic: no palpable lymphadenopathy, no hepatosplenomegaly    Chest:           Breasts:No dimpling, nipple retraction or discharge. No masses or nodes., Normal to inspection, and Normal breast tissue bilaterally           Lungs:clear to auscultation bilaterally, normal percussion bilaterally           Heart: regular rate and rhythm, S1, S2 normal, no murmur, click, rub or gallop    Abdomen: soft, non-tender, without masses or organomegaly, normal bowel sounds, without guarding, and without rebound    Pelvic:                    Vulva: normal appearing vulva with no masses, tenderness or lesions                    Cervix: normal appearing cervix without discharge or lesions, multiparous os                    Uterus: uterus is normal size, shape, consistency and nontender, anteverted, mobile, sensation of slight enlargement                    Annexa(e): no masses, adnexae are not palpable                   Rectal: normal rectal, no masses.     Extremity: normal, extremities warm, no clubbing, no cyanosis, no edema, non-tender; no CVA tenderness noted bilaterally; back alignment is excellent    Skin: normal exam    Psych and neurologic exam: WNL                Assessment:   Problem List Items Addressed This Visit          Cardiac/Vascular    Hypertension (Chronic)    Relevant Orders    ThinPrep Pap Test    CBC Auto Differential    Comprehensive Metabolic Panel    Hemoglobin A1C    Lipid Panel    Vitamin D    Thyroid Panel    Urinalysis, Reflex to Urine Culture    US Pelvis Complete Non OB       Oncology    Fibroid    Relevant Orders    ThinPrep Pap Test    CBC Auto Differential    Comprehensive Metabolic Panel "    Hemoglobin A1C    Lipid Panel    Vitamin D    Thyroid Panel    Urinalysis, Reflex to Urine Culture    US Pelvis Complete Non OB     Other Visit Diagnoses       DUB (dysfunctional uterine bleeding)    -  Primary    Relevant Orders    ThinPrep Pap Test    CBC Auto Differential    Comprehensive Metabolic Panel    Hemoglobin A1C    Lipid Panel    Vitamin D    Thyroid Panel    Urinalysis, Reflex to Urine Culture    US Pelvis Complete Non OB    Perimenopause        Relevant Orders    ThinPrep Pap Test    CBC Auto Differential    Comprehensive Metabolic Panel    Hemoglobin A1C    Lipid Panel    Vitamin D    Thyroid Panel    Urinalysis, Reflex to Urine Culture    US Pelvis Complete Non OB    Hot flash due to medication        Relevant Orders    ThinPrep Pap Test    CBC Auto Differential    Comprehensive Metabolic Panel    Hemoglobin A1C    Lipid Panel    Vitamin D    Thyroid Panel    Urinalysis, Reflex to Urine Culture    US Pelvis Complete Non OB    Vitamin D deficiency        Relevant Orders    ThinPrep Pap Test    CBC Auto Differential    Comprehensive Metabolic Panel    Hemoglobin A1C    Lipid Panel    Vitamin D    Thyroid Panel    Urinalysis, Reflex to Urine Culture    US Pelvis Complete Non OB             Plan:  Thin prep Pap; screening labs; gonadotropins; ultrasound follow-up; thyroid panel             Monitor menstrual pattern             Recommend vitamin D3 over-the-counter at 4000 units daily             Follow-up in 6 weeks               She may need in the future a referral to Hematology for evaluation of any possible hypercoagulable syndrome.  She is worried about this condition.

## 2024-07-01 NOTE — PATIENT INSTRUCTIONS
Dr. David Iyer thanks you for this office visit at [facility].    Diagnosis for this visit:   Problem List Items Addressed This Visit          Cardiac/Vascular    Hypertension (Chronic)    Relevant Orders    ThinPrep Pap Test    CBC Auto Differential    Comprehensive Metabolic Panel    Hemoglobin A1C    Lipid Panel    Vitamin D    Thyroid Panel    Urinalysis, Reflex to Urine Culture    US Pelvis Complete Non OB       Oncology    Fibroid    Relevant Orders    ThinPrep Pap Test    CBC Auto Differential    Comprehensive Metabolic Panel    Hemoglobin A1C    Lipid Panel    Vitamin D    Thyroid Panel    Urinalysis, Reflex to Urine Culture    US Pelvis Complete Non OB     Other Visit Diagnoses       DUB (dysfunctional uterine bleeding)    -  Primary    Relevant Orders    ThinPrep Pap Test    CBC Auto Differential    Comprehensive Metabolic Panel    Hemoglobin A1C    Lipid Panel    Vitamin D    Thyroid Panel    Urinalysis, Reflex to Urine Culture    US Pelvis Complete Non OB    Perimenopause        Relevant Orders    ThinPrep Pap Test    CBC Auto Differential    Comprehensive Metabolic Panel    Hemoglobin A1C    Lipid Panel    Vitamin D    Thyroid Panel    Urinalysis, Reflex to Urine Culture    US Pelvis Complete Non OB    Hot flash due to medication        Relevant Orders    ThinPrep Pap Test    CBC Auto Differential    Comprehensive Metabolic Panel    Hemoglobin A1C    Lipid Panel    Vitamin D    Thyroid Panel    Urinalysis, Reflex to Urine Culture    US Pelvis Complete Non OB    Vitamin D deficiency        Relevant Orders    ThinPrep Pap Test    CBC Auto Differential    Comprehensive Metabolic Panel    Hemoglobin A1C    Lipid Panel    Vitamin D    Thyroid Panel    Urinalysis, Reflex to Urine Culture    US Pelvis Complete Non OB             The Plan:             Thin prep Pap; screening labs; gonadotropins; ultrasound follow-up; thyroid panel             Monitor menstrual pattern             Recommend vitamin  D3 over-the-counter at 4000 units daily             Follow-up in 6 weeks      Please practice best food and exercise habits for your age.    Dr. David Iyer recommends avoidance of smoking and illicit medications or habits.    Please call  or schedule for any change in your health.     Please keep the next scheduled appointment or call for any need to change the appointment.     Dr. David Iyer recommends yearly exams for good health maintenance.      Thank you    Dr. David Iyer  07/01/2024 12:17 PM

## 2024-07-03 DIAGNOSIS — Z00.00 ENCOUNTER FOR ANNUAL PHYSICAL EXAM: Primary | ICD-10-CM

## 2024-07-03 LAB
GH SERPL-MCNC: NORMAL NG/ML
INSULIN SERPL-ACNC: NORMAL U[IU]/ML
LAB AP CLINICAL INFORMATION: NORMAL
LAB AP GYN INTERPRETATION: NEGATIVE
LAB AP PAP DISCLAIMER COMMENTS: NORMAL
RENIN PLAS-CCNC: NORMAL NG/ML/H

## 2024-08-19 ENCOUNTER — TELEPHONE (OUTPATIENT)
Dept: OBSTETRICS AND GYNECOLOGY | Facility: CLINIC | Age: 39
End: 2024-08-19
Payer: COMMERCIAL

## 2024-08-19 NOTE — TELEPHONE ENCOUNTER
----- Message from Mira Holguin LPN sent at 8/15/2024  3:57 PM CDT -----  Regarding: FW: RESCHEDULE   APPOINTMENT-    ----- Message -----  From: Macarena Medrano  Sent: 8/15/2024   3:55 PM CDT  To: Shireen MORIN Staff  Subject: RESCHEDULE   APPOINTMENT-                        Who Called: Isabel Dixon        Who Left Message for Patient:  Does the patient know what this is regarding?:YES      Preferred Method of Contact: Phone Call  Patient's Preferred Phone Number on File: [phone]   Best Call Back Number, if different:  Additional Information: NEEDS TO RESCHEDULE THE ULTRASOUND AND APPOINTMENT SCHEDULED FOR AUGUST 12

## 2024-09-16 ENCOUNTER — HOSPITAL ENCOUNTER (OUTPATIENT)
Dept: RADIOLOGY | Facility: HOSPITAL | Age: 39
Discharge: HOME | End: 2024-09-16
Attending: OBSTETRICS & GYNECOLOGY
Payer: COMMERCIAL

## 2024-09-16 ENCOUNTER — OFFICE VISIT (OUTPATIENT)
Dept: OBSTETRICS AND GYNECOLOGY | Facility: CLINIC | Age: 39
End: 2024-09-16
Attending: OBSTETRICS & GYNECOLOGY
Payer: COMMERCIAL

## 2024-09-16 VITALS
OXYGEN SATURATION: 99 % | BODY MASS INDEX: 28.7 KG/M2 | HEART RATE: 85 BPM | RESPIRATION RATE: 18 BRPM | SYSTOLIC BLOOD PRESSURE: 139 MMHG | HEIGHT: 63 IN | WEIGHT: 162 LBS | DIASTOLIC BLOOD PRESSURE: 76 MMHG

## 2024-09-16 DIAGNOSIS — N93.8 DUB (DYSFUNCTIONAL UTERINE BLEEDING): ICD-10-CM

## 2024-09-16 DIAGNOSIS — N95.1 PERIMENOPAUSE: ICD-10-CM

## 2024-09-16 DIAGNOSIS — D21.9 FIBROID: ICD-10-CM

## 2024-09-16 DIAGNOSIS — I10 HYPERTENSION, UNSPECIFIED TYPE: Chronic | ICD-10-CM

## 2024-09-16 DIAGNOSIS — E55.9 VITAMIN D DEFICIENCY: ICD-10-CM

## 2024-09-16 DIAGNOSIS — R93.89 THICKENED ENDOMETRIUM: Primary | ICD-10-CM

## 2024-09-16 DIAGNOSIS — N85.2 BULKY OR ENLARGED UTERUS: ICD-10-CM

## 2024-09-16 DIAGNOSIS — R93.89 THICKENED ENDOMETRIUM: ICD-10-CM

## 2024-09-16 DIAGNOSIS — T50.905A HOT FLASH DUE TO MEDICATION: ICD-10-CM

## 2024-09-16 DIAGNOSIS — R23.2 HOT FLASH DUE TO MEDICATION: ICD-10-CM

## 2024-09-16 DIAGNOSIS — Z78.0 POST-MENOPAUSAL: ICD-10-CM

## 2024-09-16 DIAGNOSIS — N85.2 BULKY OR ENLARGED UTERUS: Primary | ICD-10-CM

## 2024-09-16 DIAGNOSIS — Z78.0 MENOPAUSE: ICD-10-CM

## 2024-09-16 PROCEDURE — 76830 TRANSVAGINAL US NON-OB: CPT | Mod: TC

## 2024-09-16 PROCEDURE — 3078F DIAST BP <80 MM HG: CPT | Mod: ,,, | Performed by: OBSTETRICS & GYNECOLOGY

## 2024-09-16 PROCEDURE — 3044F HG A1C LEVEL LT 7.0%: CPT | Mod: ,,, | Performed by: OBSTETRICS & GYNECOLOGY

## 2024-09-16 PROCEDURE — 99214 OFFICE O/P EST MOD 30 MIN: CPT | Mod: PBBFAC,25 | Performed by: OBSTETRICS & GYNECOLOGY

## 2024-09-16 PROCEDURE — 76830 TRANSVAGINAL US NON-OB: CPT | Mod: 26,,, | Performed by: RADIOLOGY

## 2024-09-16 PROCEDURE — 4010F ACE/ARB THERAPY RXD/TAKEN: CPT | Mod: ,,, | Performed by: OBSTETRICS & GYNECOLOGY

## 2024-09-16 PROCEDURE — 76856 US EXAM PELVIC COMPLETE: CPT | Mod: 26,,, | Performed by: RADIOLOGY

## 2024-09-16 PROCEDURE — 99214 OFFICE O/P EST MOD 30 MIN: CPT | Mod: S$PBB,,, | Performed by: OBSTETRICS & GYNECOLOGY

## 2024-09-16 PROCEDURE — 76856 US EXAM PELVIC COMPLETE: CPT | Mod: TC

## 2024-09-16 PROCEDURE — 3008F BODY MASS INDEX DOCD: CPT | Mod: ,,, | Performed by: OBSTETRICS & GYNECOLOGY

## 2024-09-16 PROCEDURE — 99999 PR PBB SHADOW E&M-EST. PATIENT-LVL IV: CPT | Mod: PBBFAC,,, | Performed by: OBSTETRICS & GYNECOLOGY

## 2024-09-16 PROCEDURE — 3075F SYST BP GE 130 - 139MM HG: CPT | Mod: ,,, | Performed by: OBSTETRICS & GYNECOLOGY

## 2024-09-16 NOTE — PATIENT INSTRUCTIONS
Dr. David Iyer thanks you for this office visit at [facility].    Diagnosis for this visit:   Problem List Items Addressed This Visit    None  Visit Diagnoses       Thickened endometrium    -  Primary    By ultrasound today 10.6 mm    Bulky or enlarged uterus        DUB (dysfunctional uterine bleeding)        Menopause        Nocturnal symptoms             The Plan: Gonadotropins and estradiol; repeat calcium; repeat CBC             Schedule this patient for office hysteroscopy and endometrial biopsy      Please practice best food and exercise habits for your age.    Dr. David Iyer recommends avoidance of smoking and illicit medications or habits.    Please call  or schedule for any change in your health.     Please keep the next scheduled appointment or call for any need to change the appointment.     Dr. David Iyer recommends yearly exams for good health maintenance.      Thank you    Dr. David Iyer  09/16/2024 3:46 PM

## 2024-09-16 NOTE — PROGRESS NOTES
"Isabel Dixon female  for   Chief Complaint   Patient presents with    Follow-up     11 week follow up patient noted that she is still having cycles every 2 weeks and adds that she is now having lower quadrant left side pain that started about 2-3 days.           PHI:  Patient presents for follow-up.  She currently is 39 years of age  4, para 3, AB1  female who has had since 2024 recurrent abnormal uterine bleeding.  Her most recent abnormal bleeding began on 2024 for 2 weeks.  She states cycling is usually now every 2 weeks.  Occasionally she has cramping.  Patient denies dyspareunia.  Patient denies any breast discharge.    Her chart review indicates normal thyroid function and normal labs except for low calcium of 8.0 mg per dL.  Her CBC reveals hemoglobin 11.6 but a normal red cell indices.  Patient denies ice PICA.    Lab reviewed does not indicate as recommended by doctor Shireen gonadotropins or estradiol or estradiol level.    Past Medical History:   Diagnosis Date    Anemia     Fibroid ?    Hypertension       Past Surgical History:   Procedure Laterality Date    ABDOMINAL SURGERY  ?    Removed fibroids and fixed fused uterus     SECTION   and     TUBAL LIGATION        Review of patient's allergies indicates:   Allergen Reactions    Iodine Rash    Shellfish containing products Anaphylaxis    Penicillins Hives        ROS:Pertinent items are noted in HPI.    Physical exam:    /76 (BP Location: Right arm, Patient Position: Sitting, BP Method: Small (Automatic))   Pulse 85   Resp 18   Ht 5' 3" (1.6 m)   Wt 73.5 kg (162 lb)   LMP 2024 (Exact Date)   SpO2 99%   BMI 28.70 kg/m²      General Appearance: healthy, alert, no distress, smiling    Chest:           Lungs: clear to auscultation bilaterally           Heart: regular rate and rhythm, S1, S2 normal, no murmur, click, rub or gallop    Abdomen:not performed    Pelvic: not performed "     Extremity: normal    Skin: normal exam        Assessment:   Problem List Items Addressed This Visit    None  Visit Diagnoses       Thickened endometrium    -  Primary    By ultrasound today 10.6 mm    Bulky or enlarged uterus        DUB (dysfunctional uterine bleeding)        Menopause        Nocturnal symptoms             Plan:  Gonadotropins and estradiol; repeat calcium; repeat CBC             Schedule this patient for office hysteroscopy and endometrial biopsy

## 2024-09-30 ENCOUNTER — PROCEDURE VISIT (OUTPATIENT)
Dept: OBSTETRICS AND GYNECOLOGY | Facility: CLINIC | Age: 39
End: 2024-09-30
Payer: COMMERCIAL

## 2024-09-30 VITALS
HEIGHT: 63 IN | WEIGHT: 162.19 LBS | OXYGEN SATURATION: 99 % | HEART RATE: 68 BPM | BODY MASS INDEX: 28.74 KG/M2 | SYSTOLIC BLOOD PRESSURE: 118 MMHG | DIASTOLIC BLOOD PRESSURE: 70 MMHG | RESPIRATION RATE: 18 BRPM

## 2024-09-30 DIAGNOSIS — I10 HYPERTENSION, UNSPECIFIED TYPE: Primary | Chronic | ICD-10-CM

## 2024-09-30 DIAGNOSIS — Z78.0 POST-MENOPAUSAL: ICD-10-CM

## 2024-09-30 DIAGNOSIS — R93.89 THICKENED ENDOMETRIUM: ICD-10-CM

## 2024-09-30 DIAGNOSIS — N85.2 BULKY OR ENLARGED UTERUS: ICD-10-CM

## 2024-09-30 DIAGNOSIS — N84.0 ENDOMETRIAL POLYP: ICD-10-CM

## 2024-09-30 DIAGNOSIS — N93.8 DUB (DYSFUNCTIONAL UTERINE BLEEDING): ICD-10-CM

## 2024-09-30 PROCEDURE — 96372 THER/PROPH/DIAG INJ SC/IM: CPT | Mod: PBBFAC | Performed by: OBSTETRICS & GYNECOLOGY

## 2024-09-30 PROCEDURE — 99999PBSHW PR PBB SHADOW TECHNICAL ONLY FILED TO HB: Mod: PBBFAC,,,

## 2024-09-30 PROCEDURE — 99214 OFFICE O/P EST MOD 30 MIN: CPT | Mod: S$PBB,25,, | Performed by: OBSTETRICS & GYNECOLOGY

## 2024-09-30 PROCEDURE — 58558 HYSTEROSCOPY BIOPSY: CPT | Mod: PBBFAC | Performed by: OBSTETRICS & GYNECOLOGY

## 2024-09-30 PROCEDURE — 58558 HYSTEROSCOPY BIOPSY: CPT | Mod: S$PBB,,, | Performed by: OBSTETRICS & GYNECOLOGY

## 2024-09-30 RX ORDER — KETOROLAC TROMETHAMINE 30 MG/ML
60 INJECTION, SOLUTION INTRAMUSCULAR; INTRAVENOUS
Status: COMPLETED | OUTPATIENT
Start: 2024-09-30 | End: 2024-09-30

## 2024-09-30 RX ADMIN — KETOROLAC TROMETHAMINE 60 MG: 30 INJECTION, SOLUTION INTRAMUSCULAR at 01:09

## 2024-09-30 NOTE — PROGRESS NOTES
Isabel Dixon female  for   Chief Complaint   Patient presents with    Procedure     Patient is here for a hysteroscopy, no concerns or problems today      OB History          4    Para   3    Term                AB   1    Living   3         SAB   1    IAB        Ectopic        Multiple        Live Births                      HPI:  Patient presents today for office hysteroscopy endometrial biopsy due to history of dysfunctional uterine bleeding.  Endometrium is thickened on recent pelvic ultrasound.  Last menstrual period was 2024.  Recent pelvic ultrasound finding:  FINDINGS:  Uterus: The uterus measures 10.0 x 4.6 x 5.9 cm in dimension.  No uterine masses appreciated.  There is a normal homogeneous uterine parenchymal echotexture.  The endometrial stripe measures 11 mm, normal for a premenopausal patient.  No fluid/blood products within the endometrial canal.  There is a 13 mm cervical nabothian cyst.     Ovaries: The right ovary measures 2.7 x 1.5 x 1.8 cm in dimension and the left ovary measures 3.3 x 2.3 x 2.1 cm in dimension.  No solid ovarian masses appreciated.  There are a few simple follicles in the left ovary..  There is normal arterial and venous color flow present in both ovaries.     Adnexal soft tissues: No free fluid noted in the cul de sac.  No solid or cystic adnexal masses.    Past Medical History:   Diagnosis Date    Anemia 2009    Fibroid ?    Hypertension       Past Surgical History:   Procedure Laterality Date    ABDOMINAL SURGERY  ?    Removed fibroids and fixed fused uterus     SECTION   and     TUBAL LIGATION        Review of patient's allergies indicates:   Allergen Reactions    Iodine Rash    Shellfish containing products Anaphylaxis    Penicillins Hives        Review of Systems:Pertinent items are noted in HPI.     Physical exam:This gyn related exam was chaperoned by a female medical assistant.      /70 (BP Location: Right arm,  "Patient Position: Sitting)   Pulse 68   Resp 18   Ht 5' 3" (1.6 m)   Wt 73.6 kg (162 lb 3.2 oz)   LMP 08/30/2024 (Exact Date)   SpO2 99%   BMI 28.73 kg/m²      General Appearance: healthy    HEENT: Normal exam    Lymphatic: no palpable lymphadenopathy, no hepatosplenomegaly    Chest:           Breasts:  No breast           Lungs:clear to auscultation bilaterally           Heart: regular rate and rhythm, S1, S2 normal, no murmur, click, rub or gallop    Abdomen: soft, non-tender, without masses or organomegaly    Pelvic:                    Vulva: normal appearing vulva with no masses, tenderness or lesions                    Cervix: normal appearing cervix without discharge or lesions, multiparous os, stigma of prior cryosurgery                    Uterus: retroverted, mobile, minimally enlarged                    Annexa(e): normal adnexa in size, nontender and no masses                   Rectal: normal rectal, no masses, rectal exam not indicated.     Extremity: normal, extremities warm, no clubbing, no cyanosis, no edema    Skin: normal exam    Psych and neurologic exam: WNL                Assessment:   Problem List Items Addressed This Visit          Cardiac/Vascular    Hypertension - Primary (Chronic)     Other Visit Diagnoses       Bulky or enlarged uterus        Relevant Orders    Surgical Pathology (Completed)    Thickened endometrium        11 mm by ultrasound    Relevant Orders    Surgical Pathology (Completed)    DUB (dysfunctional uterine bleeding)        Relevant Orders    Surgical Pathology (Completed)    Post-menopausal        Endometrial polyp        Probable by pathology diagnosis from the endometrial biopsy             Plan:  Office hysteroscopy and endometrial biopsy-she tolerated procedure well     Addendum on 10/03/2024:    Biopsy report    Final Diagnosis   A. Endometrium, biopsy:  - Proliferative endometrium  - Findings suggestive of endometrial polyp       "

## 2024-09-30 NOTE — PROCEDURES
Patient Name: Isabel Dixon  MRN: [id number]  [facility] - Obstetrics And Gynecology    Date of Surgery:09/30/2024    Operative Report:    Before the procedure: Consent for Office Hysteroscopy/Biopsy    The office based procedure in very minimal. The procedure will examine the lining (endometrium) of the uterus. The vagina and cervix will be inspected and a Hurricane anesthetic spray will induce a minor local anesthesia on the cervi. A grasper (tenaculum) will be placed on the cervix to stabilize the uterus for the use of a small, disposable hysteroscope.The scope contains LED lighting and a color video camera. The scope is used only once and is then throw away! The hysteroscope allows close inspection of endometrial condition. During this phase of the procedure a small amount of sterile saline is used to expand the cavity of the uterus. The patient will experience some cramping and a coolness of the room temperature saline fluid.  The fluid volume is usually less than a 100 cc. Usually an endometrial biopsy will be performed at the same time for a tissue diagnosis.Such pathology as fibroid or polyp(s) can not be removed due to the small size of the equipment of the procedure. The procedure is very brief. After the procedure there may be some cramping and slight bleeding or spotting. These post procedure symptoms resolve very quickly.    Toradol is usually given before the procedure to reduce the cramping discomnfort that is similar to a menses (menstrual period).    The risks: Very minimal risk of post procedure infection of the uterine lining (endometritis) or vaginal infection. There is some cramping during and after the procedure. There is a very remote risk of uterine perforation with the procedure - very rare.      Post Procedure Instructions:    The patient is advised to shower for a few days after the procedure and avoid sexual intercourse. She is advised to call the office for any symptoms  including fever, chills, urinary tract symptoms, significant vaginal bleeding or abdomnal /pelvic pain that is worsening.    Isabel Dixon has voiced understanding and she does consent. Isabel Dixon is aware that this conversation is impossible to include all possible items of risk.    This conversion with Dr. David Iyer and Isabel Dixon occurred on 09/30/2024 at 1:39 PM as part of the completion of the pre-operative evaluation in preparation for surgery.      Procedure: Office Hysteroscopy/Biopsy at [facility] - Ob[facility]ne[facility]    Pre op Diagnosis:  Dysfunctional uterine bleeding    Post op Diagnosis:  Dysfunctional uterine bleeding; edit diagnosis from pathology biopsy report:  Endometrial polyp    Surgeon: Dr David Iyer    Anesthesia: Local [place] Hurricane Anesthesia    Findings:  Thickened endometrium by hysteroscopy    Complication(s): none    Condition:good    Estimated Blood loss: Minimal    Description:    Isabel Dixon was placed into the dorso lithotomy position position for the procedure.    The vulva and the vagina were prepped with Betadine solution. A disposable speculum was placed into the vagina. The cervix was visualized.    Hurricane spray was used to spray the vagina and the cervix.    A single tooth tenaculum grasped the exocervix and the cervix was placed under traction. The uterus was sound to  10cm.    Using the Endosee hysteroscopic, the tip was placed into the endocervical canal and passed into the endometrial cavity.     The exam revealed revealed:  Thickened endometrium with no evidence of endometrial polyp or leiomyoma .    The hysteroscope was removed and a Marah endometrial biopsy was inserted and under suction a sample of the endometrium was obtained. The procedure involved turning the biopsy tool 360 degrees for uniform sampling within the endometrial cavity.    After the sample was obtained, there was noted slight bleeding from the exocervix.    The  tenaculum was removed. The speculum was removed.    The patient was placed supine and then allowed to sit up. Subsequently she was ambulated.    She tolerated the procedure. Her good    EBL was Minimal    The tissue obtained was sent in formalin to the pathology lab for histopathologic evaluation.    David Iyer MD  [facility]  Signed:  09/30/2024 1:39 PM    Addendum on 10/03/2024:    Endometrial biopsy report with hysteroscopy    Final Diagnosis   A. Endometrium, biopsy:  - Proliferative endometrium  - Findings suggestive of endometrial polyp               .

## 2024-09-30 NOTE — PATIENT INSTRUCTIONS
Dr. David Iyer thanks you for this office procedure visit at [facility].    The diagnosis for this procedure:   Problem List Items Addressed This Visit          Cardiac/Vascular    Hypertension - Primary (Chronic)     Other Visit Diagnoses       Bulky or enlarged uterus        Thickened endometrium        11 mm by ultrasound    DUB (dysfunctional uterine bleeding)        Post-menopausal                 The office procedure performed was:  Office hysteroscopy and endometrial biopsy.    Dr. David Iyer recommends the following:    For external or internal genital office procedure:     A) Refrain from sexual intercourse for 5 days  B) Shower for several days  C) Resume a regular diet for meals and resume normal activity in the evening after the procedure.  D) Resume work activities the next day  E) For discomfort relief, Dr David Iyer recommends over the counter:              Tylenol - one or two 500 mg strength every 6-8 hours for discomfort relief       Warning:Do not exceed more than 4 grams of Tylenol per day and avoid alcohol use while using the Tylenol dosing schedule.  OR        Ibuprofen 800 mg every 6-8 hours for discomfort relief    General advise:    1) Please call back for any adverse symptoms after the procedure. Please call for any excessive vaginal bleeding or spotting, fever, chills or onset of a odorous vaginal discharge. The immediate cramping should resolve in a few hours.    2) Please use medication(s) as directed.    3) Please call or schedule for any change in your current health.     4) Please keep the next scheduled appointment to discuss the results of the procedure.   In order to prevent miscommunication, Dr David Iyer perfers to discuss the procedure result(s) with a face to face meeting with you.       Thank you    David Iyer MD  09/30/2024 1:41 PM

## 2024-10-01 PROCEDURE — 88305 TISSUE EXAM BY PATHOLOGIST: CPT | Mod: TC,SUR | Performed by: OBSTETRICS & GYNECOLOGY

## 2024-10-01 PROCEDURE — 88305 TISSUE EXAM BY PATHOLOGIST: CPT | Mod: 26,,, | Performed by: PATHOLOGY

## 2024-10-03 LAB
ESTROGEN SERPL-MCNC: NORMAL PG/ML
INSULIN SERPL-ACNC: NORMAL U[IU]/ML
LAB AP CLINICAL INFORMATION: NORMAL
LAB AP GROSS DESCRIPTION: NORMAL
LAB AP LABORATORY NOTES: NORMAL
T3RU NFR SERPL: NORMAL %

## 2024-10-14 ENCOUNTER — OFFICE VISIT (OUTPATIENT)
Dept: OBSTETRICS AND GYNECOLOGY | Facility: CLINIC | Age: 39
End: 2024-10-14
Payer: COMMERCIAL

## 2024-10-14 VITALS
BODY MASS INDEX: 28.7 KG/M2 | DIASTOLIC BLOOD PRESSURE: 63 MMHG | OXYGEN SATURATION: 99 % | WEIGHT: 162 LBS | SYSTOLIC BLOOD PRESSURE: 120 MMHG | RESPIRATION RATE: 18 BRPM | HEART RATE: 100 BPM | HEIGHT: 63 IN

## 2024-10-14 DIAGNOSIS — N85.2 BULKY OR ENLARGED UTERUS: ICD-10-CM

## 2024-10-14 DIAGNOSIS — N84.0 ENDOMETRIAL POLYP: Primary | ICD-10-CM

## 2024-10-14 PROCEDURE — 3074F SYST BP LT 130 MM HG: CPT | Mod: ,,, | Performed by: OBSTETRICS & GYNECOLOGY

## 2024-10-14 PROCEDURE — 3078F DIAST BP <80 MM HG: CPT | Mod: ,,, | Performed by: OBSTETRICS & GYNECOLOGY

## 2024-10-14 PROCEDURE — 4010F ACE/ARB THERAPY RXD/TAKEN: CPT | Mod: ,,, | Performed by: OBSTETRICS & GYNECOLOGY

## 2024-10-14 PROCEDURE — 3008F BODY MASS INDEX DOCD: CPT | Mod: ,,, | Performed by: OBSTETRICS & GYNECOLOGY

## 2024-10-14 PROCEDURE — 99214 OFFICE O/P EST MOD 30 MIN: CPT | Mod: PBBFAC | Performed by: OBSTETRICS & GYNECOLOGY

## 2024-10-14 PROCEDURE — 99999 PR PBB SHADOW E&M-EST. PATIENT-LVL IV: CPT | Mod: PBBFAC,,, | Performed by: OBSTETRICS & GYNECOLOGY

## 2024-10-14 PROCEDURE — 1160F RVW MEDS BY RX/DR IN RCRD: CPT | Mod: ,,, | Performed by: OBSTETRICS & GYNECOLOGY

## 2024-10-14 PROCEDURE — 3044F HG A1C LEVEL LT 7.0%: CPT | Mod: ,,, | Performed by: OBSTETRICS & GYNECOLOGY

## 2024-10-14 PROCEDURE — 99214 OFFICE O/P EST MOD 30 MIN: CPT | Mod: S$PBB,,, | Performed by: OBSTETRICS & GYNECOLOGY

## 2024-10-14 PROCEDURE — 1159F MED LIST DOCD IN RCRD: CPT | Mod: ,,, | Performed by: OBSTETRICS & GYNECOLOGY

## 2024-10-14 NOTE — PROGRESS NOTES
"Isabel Dixon female  for   Chief Complaint   Patient presents with    Follow-up     2 week follow up after having a Hysterscopy done in clinic and HTN, patient denied having any problems today.           PHI:  Patient was here for follow-up of her office hysteroscopy and biopsy.  The office hysteroscopy indicated a thickened endometrium.  The biopsy indicated a proliferative endometrium and a probable endometrial polyp.    Just prior to the visit the patient did have a normal menstrual cycles compared to previous abnormal menstrual cycles.    Patient was had no adverse effects since the office hysteroscopy/biopsy.    Past Medical History:   Diagnosis Date    Anemia     Fibroid ?    Hypertension       Past Surgical History:   Procedure Laterality Date    ABDOMINAL SURGERY  ?    Removed fibroids and fixed fused uterus     SECTION   and     TUBAL LIGATION        Review of patient's allergies indicates:   Allergen Reactions    Iodine Rash    Shellfish containing products Anaphylaxis    Penicillins Hives        ROS:Pertinent items are noted in HPI.    Physical exam:This gyn related exam was chaperoned by a female medical assistant.      /63   Pulse 100   Resp 18   Ht 5' 3" (1.6 m)   Wt 73.5 kg (162 lb)   LMP 2024 (Exact Date)   SpO2 99%   BMI 28.70 kg/m²      General Appearance: healthy, alert, no distress, smiling    Chest:           Lungs: clear to auscultation bilaterally           Heart: regular rate and rhythm, S1, S2 normal, no murmur, click, rub or gallop    Abdomen:  Soft and nontender    Pelvic:  Normal vulva; speculum exam is unremarkable; cervix is multiparous and unremarkable; uterus is enlarged consistent with her parity and anteflexed around 8-10 week size-symmetrical with no adnexal tenderness; no rectal exam was performed    Extremity: normal    Skin: normal exam        Assessment:   Problem List Items Addressed This Visit    None  Visit Diagnoses  "      Endometrial polyp    -  Primary    Bulky or enlarged uterus        Due to multiparity and not leiomyoma             Plan:  Histopathology report was discussed.  The patient may have endometrial polyp changes.  There is no hyperplasia noted.  Patient states last menses as compared to previous menses was considered normal.              After discussion best recommendation at this point since there was no hyperplasia is to monitor menstrual pattern.  If she does have persistent menorrhagia irregular menses then doctor Shireen would recommend a D&C/hysteroscopy under anesthesia.

## 2024-10-14 NOTE — PATIENT INSTRUCTIONS
Dr. David Iyer thanks you for this office visit at [facility].    Diagnosis for this visit:   Problem List Items Addressed This Visit    None  Visit Diagnoses       Endometrial polyp    -  Primary    Bulky or enlarged uterus        Due to multiparity and not leiomyoma             The Plan: Histopathology report was discussed.  The patient may have endometrial polyp changes.  There is no hyperplasia noted.  Patient states last menses as compared to previous menses was considered normal.              After discussion best recommendation at this point since there was no hyperplasia is to monitor menstrual pattern.  If she does have persistent menorrhagia irregular menses then doctor Shireen would recommend a D&C/hysteroscopy under anesthesia.      Please practice best food and exercise habits for your age.    Dr. David Iyer recommends avoidance of smoking and illicit medications or habits.    Please call  or schedule for any change in your health.     Please keep the next scheduled appointment or call for any need to change the appointment.     Dr. David Iyer recommends yearly exams for good health maintenance.      Thank you    Dr. David Iyer  10/14/2024 3:14 PM

## 2025-06-26 ENCOUNTER — OFFICE VISIT (OUTPATIENT)
Dept: FAMILY MEDICINE | Facility: CLINIC | Age: 40
End: 2025-06-26
Payer: COMMERCIAL

## 2025-06-26 VITALS
WEIGHT: 158 LBS | HEIGHT: 63 IN | SYSTOLIC BLOOD PRESSURE: 118 MMHG | DIASTOLIC BLOOD PRESSURE: 80 MMHG | OXYGEN SATURATION: 99 % | HEART RATE: 81 BPM | BODY MASS INDEX: 28 KG/M2 | RESPIRATION RATE: 18 BRPM | TEMPERATURE: 97 F

## 2025-06-26 DIAGNOSIS — G43.009 MIGRAINE WITHOUT AURA AND WITHOUT STATUS MIGRAINOSUS, NOT INTRACTABLE: Primary | ICD-10-CM

## 2025-06-26 DIAGNOSIS — I10 HYPERTENSION, UNSPECIFIED TYPE: Chronic | ICD-10-CM

## 2025-06-26 PROCEDURE — 3008F BODY MASS INDEX DOCD: CPT | Mod: ,,,

## 2025-06-26 PROCEDURE — 99214 OFFICE O/P EST MOD 30 MIN: CPT | Mod: ,,,

## 2025-06-26 PROCEDURE — 1159F MED LIST DOCD IN RCRD: CPT | Mod: ,,,

## 2025-06-26 PROCEDURE — 4010F ACE/ARB THERAPY RXD/TAKEN: CPT | Mod: ,,,

## 2025-06-26 PROCEDURE — 3074F SYST BP LT 130 MM HG: CPT | Mod: ,,,

## 2025-06-26 PROCEDURE — 1160F RVW MEDS BY RX/DR IN RCRD: CPT | Mod: ,,,

## 2025-06-26 PROCEDURE — 3079F DIAST BP 80-89 MM HG: CPT | Mod: ,,,

## 2025-06-26 RX ORDER — LISINOPRIL 5 MG/1
5 TABLET ORAL DAILY
Qty: 90 TABLET | Refills: 3 | Status: SHIPPED | OUTPATIENT
Start: 2025-06-26 | End: 2026-06-21

## 2025-06-26 RX ORDER — RIMEGEPANT SULFATE 75 MG/75MG
75 TABLET, ORALLY DISINTEGRATING ORAL DAILY PRN
Qty: 16 TABLET | Refills: 5 | Status: SHIPPED | OUTPATIENT
Start: 2025-06-26

## 2025-06-26 NOTE — PROGRESS NOTES
Subjective     Patient ID: Isabel Dixon is a 40 y.o. female.    Chief Complaint: Medication Refill (40 y.o. female is here for medication refills. Needs lisinopril. Requesting new prescription for a medication like ubrelvy. States insurance won't pay for it. Wearing heart monitor per Dr. Solis.states she got it today and has to wear it for 7 days. )    Medication Refill  Review of Systems  History of Present Illness    CHIEF COMPLAINT:  Patient presents today for medication refills.    MIGRAINES:  She experiences approximately 5 migraines per month, with 2 being severe and lasting several days. She has tried multiple preventive and acute treatments including Topamax, beta blockers, and Aimovig without sustained success. She has been taking ubrelvy for acute migraine with good response to the medication but her insurance does not cover ubrelvy. She is seeking alternative treatment.     CARDIOVASCULAR:  She reports experiencing palpitations and racing heart rate since February. She is currently wearing a 7-day heart monitor [facility]. Previous cardiac workup, including labs and EKG, have been normal. She has been prescribed Lisinopril 5 mg but reports being out of this medication for approximately one week. She denies any additional cardiac symptoms.      ROS:  General: -fever, -chills, -fatigue, -weight gain, -weight loss  Eyes: -vision changes, -redness, -discharge  ENT: -ear pain, -nasal congestion, -sore throat  Cardiovascular: -chest pain, +palpitations, -lower extremity edema, +feeling of flutter in chest, +feelings of fast heart rate  Respiratory: -cough, -shortness of breath  Gastrointestinal: -abdominal pain, -nausea, -vomiting, -diarrhea, -constipation, -blood in stool  Genitourinary: -dysuria, -hematuria, -frequency  Musculoskeletal: -joint pain, -muscle pain  Skin: -rash, -lesion  Neurological: -headache, -dizziness, -numbness, -tingling, +migraines  Psychiatric: -anxiety,  "-depression, -sleep difficulty             Objective     Physical Exam  Vitals and nursing note reviewed.   Constitutional:       Appearance: Normal appearance. She is normal weight.   HENT:      Head: Normocephalic.      Nose: Nose normal.      Mouth/Throat:      Mouth: Mucous membranes are moist.   Eyes:      Extraocular Movements: Extraocular movements intact.      Conjunctiva/sclera: Conjunctivae normal.      Pupils: Pupils are equal, round, and reactive to light.   Cardiovascular:      Rate and Rhythm: Normal rate and regular rhythm.      Pulses: Normal pulses.      Heart sounds: Normal heart sounds.   Pulmonary:      Effort: Pulmonary effort is normal.      Breath sounds: Normal breath sounds.   Abdominal:      General: Abdomen is flat. Bowel sounds are normal.      Palpations: Abdomen is soft.   Musculoskeletal:         General: Normal range of motion.      Cervical back: Normal range of motion and neck supple.   Skin:     General: Skin is warm and dry.      Capillary Refill: Capillary refill takes less than 2 seconds.   Neurological:      General: No focal deficit present.      Mental Status: She is alert and oriented to person, place, and time.   Psychiatric:         Behavior: Behavior normal.         Thought Content: Thought content normal.        Assessment and Plan     1. Migraine without aura and without status migrainosus, not intractable  -     rimegepant (NURTEC) 75 mg odt; Take 1 tablet (75 mg total) by mouth daily as needed for Migraine. Place ODT tablet on the tongue; alternatively the ODT tablet may be placed under the tongue. "MAX dose of 1 tablet per 24h/or day"  Dispense: 16 tablet; Refill: 5    2. Hypertension, unspecified type  -     lisinopriL (PRINIVIL,ZESTRIL) 5 MG tablet; Take 1 tablet (5 mg total) by mouth once daily.  Dispense: 90 tablet; Refill: 3      Assessment & Plan      Begin nurtec as prescribed  Continue lisinopril         Follow up in about 6 months (around " 12/26/2025).    This note was generated with the assistance of ambient listening technology. Verbal consent was obtained by the patient and accompanying visitor(s) for the recording of patient appointment to facilitate this note. I attest to having reviewed and edited the generated note for accuracy, though some syntax or spelling errors may persist. Please contact the author of this note for any clarification.